# Patient Record
Sex: FEMALE | Race: WHITE | Employment: FULL TIME | URBAN - METROPOLITAN AREA
[De-identification: names, ages, dates, MRNs, and addresses within clinical notes are randomized per-mention and may not be internally consistent; named-entity substitution may affect disease eponyms.]

---

## 2017-04-18 ENCOUNTER — OFFICE VISIT (OUTPATIENT)
Dept: FAMILY MEDICINE CLINIC | Facility: CLINIC | Age: 37
End: 2017-04-18

## 2017-04-18 VITALS
OXYGEN SATURATION: 98 % | BODY MASS INDEX: 23.56 KG/M2 | HEART RATE: 80 BPM | WEIGHT: 120 LBS | DIASTOLIC BLOOD PRESSURE: 66 MMHG | TEMPERATURE: 98 F | SYSTOLIC BLOOD PRESSURE: 118 MMHG | HEIGHT: 60 IN | RESPIRATION RATE: 16 BRPM

## 2017-04-18 DIAGNOSIS — Z12.31 ENCOUNTER FOR SCREENING MAMMOGRAM FOR BREAST CANCER: ICD-10-CM

## 2017-04-18 DIAGNOSIS — F32.81 PMDD (PREMENSTRUAL DYSPHORIC DISORDER): ICD-10-CM

## 2017-04-18 DIAGNOSIS — G43.829 MENSTRUAL MIGRAINE WITHOUT STATUS MIGRAINOSUS, NOT INTRACTABLE: ICD-10-CM

## 2017-04-18 DIAGNOSIS — Z01.419 WELL WOMAN EXAM: Primary | ICD-10-CM

## 2017-04-18 PROCEDURE — 99395 PREV VISIT EST AGE 18-39: CPT | Performed by: INTERNAL MEDICINE

## 2017-04-18 RX ORDER — ONDANSETRON 8 MG/1
8 TABLET, ORALLY DISINTEGRATING ORAL EVERY 8 HOURS PRN
Qty: 10 TABLET | Refills: 0 | Status: SHIPPED | OUTPATIENT
Start: 2017-04-18 | End: 2019-10-23

## 2017-04-18 NOTE — PROGRESS NOTES
HPI:   Betsey Craig is a 40year old female who presents for a well woman exam. Symptoms: denies discharge, itching, burning or dysuria, periods are regular, flow is 7 days, continues to have PMS symptoms but only 2 days during menses, instead of t denies dysuria, vaginal discharge or itching; denies pelvic pain  MS: denies back pain  NEURO: chronic but stable headaches- no change in character; no dizziness  PSYCH: PMS as above; denies depression or anxiety  HEME: denies hx of anemia  ENDOCRINE: anita Tod Wang was given an opportunity to ask questions and verbalized understanding of care. Follow up 1 year. Jeffery Braga

## 2017-04-18 NOTE — PATIENT INSTRUCTIONS
LAB HOURS & LOCATIONS        [place]  ([facility])    [address]   [address] [place] [address]     ([address])         [address]    Mon-Fri  5am-8pm     Mon-Fri   7am-4pm  Sat         6am-3pm     Sat          7am-3pm      Rishi Leone

## 2017-04-26 ENCOUNTER — LAB ENCOUNTER (OUTPATIENT)
Dept: LAB | Age: 37
End: 2017-04-26
Attending: INTERNAL MEDICINE
Payer: COMMERCIAL

## 2017-04-26 ENCOUNTER — HOSPITAL ENCOUNTER (OUTPATIENT)
Dept: MAMMOGRAPHY | Facility: HOSPITAL | Age: 37
Discharge: HOME | End: 2017-04-26
Attending: INTERNAL MEDICINE
Payer: COMMERCIAL

## 2017-04-26 DIAGNOSIS — Z01.419 WELL WOMAN EXAM: ICD-10-CM

## 2017-04-26 DIAGNOSIS — Z12.31 ENCOUNTER FOR SCREENING MAMMOGRAM FOR BREAST CANCER: ICD-10-CM

## 2017-04-26 PROCEDURE — 82306 VITAMIN D 25 HYDROXY: CPT | Performed by: INTERNAL MEDICINE

## 2017-04-26 PROCEDURE — 80061 LIPID PANEL: CPT | Performed by: INTERNAL MEDICINE

## 2017-04-26 PROCEDURE — 77063 BREAST TOMOSYNTHESIS BI: CPT

## 2017-04-26 PROCEDURE — 77067 SCR MAMMO BI INCL CAD: CPT

## 2017-04-26 PROCEDURE — 36415 COLL VENOUS BLD VENIPUNCTURE: CPT | Performed by: INTERNAL MEDICINE

## 2017-04-26 PROCEDURE — 80050 GENERAL HEALTH PANEL: CPT | Performed by: INTERNAL MEDICINE

## 2019-10-23 ENCOUNTER — OFFICE VISIT (OUTPATIENT)
Dept: FAMILY MEDICINE CLINIC | Facility: CLINIC | Age: 39
End: 2019-10-23
Payer: COMMERCIAL

## 2019-10-23 VITALS
RESPIRATION RATE: 17 BRPM | WEIGHT: 117 LBS | OXYGEN SATURATION: 98 % | DIASTOLIC BLOOD PRESSURE: 80 MMHG | TEMPERATURE: 98 F | HEIGHT: 60 IN | BODY MASS INDEX: 22.97 KG/M2 | HEART RATE: 83 BPM | SYSTOLIC BLOOD PRESSURE: 110 MMHG

## 2019-10-23 DIAGNOSIS — H10.32 ACUTE BACTERIAL CONJUNCTIVITIS OF LEFT EYE: ICD-10-CM

## 2019-10-23 DIAGNOSIS — R05.9 COUGH: Primary | ICD-10-CM

## 2019-10-23 PROCEDURE — 99214 OFFICE O/P EST MOD 30 MIN: CPT | Performed by: FAMILY MEDICINE

## 2019-10-23 RX ORDER — BENZONATATE 100 MG/1
100 CAPSULE ORAL 3 TIMES DAILY PRN
Qty: 30 CAPSULE | Refills: 0 | Status: SHIPPED | OUTPATIENT
Start: 2019-10-23 | End: 2020-08-21

## 2019-10-23 RX ORDER — ALBUTEROL SULFATE 90 UG/1
2 AEROSOL, METERED RESPIRATORY (INHALATION) EVERY 4 HOURS PRN
Qty: 1 INHALER | Refills: 0 | Status: SHIPPED | OUTPATIENT
Start: 2019-10-23 | End: 2021-05-24

## 2019-10-23 RX ORDER — ALBUTEROL SULFATE 90 UG/1
2 AEROSOL, METERED RESPIRATORY (INHALATION)
COMMUNITY
End: 2019-10-23

## 2019-10-23 RX ORDER — AZITHROMYCIN 250 MG/1
TABLET, FILM COATED ORAL
Qty: 6 TABLET | Refills: 0 | Status: SHIPPED | OUTPATIENT
Start: 2019-10-23 | End: 2020-08-21

## 2019-10-23 RX ORDER — CIPROFLOXACIN HYDROCHLORIDE 3.5 MG/ML
1 SOLUTION/ DROPS TOPICAL
Qty: 1 BOTTLE | Refills: 0 | Status: SHIPPED | OUTPATIENT
Start: 2019-10-23 | End: 2019-10-30

## 2019-10-23 NOTE — PATIENT INSTRUCTIONS
What Is Conjunctivitis? Conjunctivitis is an irritation or infection. It affects the membrane that covers the white of your eye and the inside of your eyelid (conjunctiva). It can happen to one or both eyes.  The membrane swells and the blood vessels e Bronchitis is an infection of the air passages (bronchial tubes) in your lungs. It often occurs when you have a cold.  This illness is contagious during the first few days and is spread through the air by coughing and sneezing, or by direct contact (touchin Follow up with your healthcare provider, or as advised. If you had an X-ray or ECG (electrocardiogram), a specialist will review it. You will be told of any new test results that may affect your care.   If you are age 72 or older, if you smoke, or if you ha

## 2019-10-23 NOTE — PROGRESS NOTES
CHIEF COMPLAINT: Patient presents with:  Cough: dry  Voice Problem  Back Pain  Body ache and/or chills: aches no chills  Eye Problem: left, redness        HPI:     Nichol Conroy is a 46year old female presents for cough and left eye redness.     Starr Ophthalmic Solution, Place 1 drop into the left eye every 4 (four) hours while awake for 7 days. , Disp: 1 Bottle, Rfl: 0  OCELLA 3-0.03 MG Oral Tab, Take 1 tablet by mouth once daily. , Disp: , Rfl: 11  Fluticasone Propionate (FLONASE) 50 MCG/ACT Nasal Susp rate, regular rhythm and normal heart sounds. Pulmonary/Chest: Effort normal and breath sounds normal. No respiratory distress. She has no wheezes. Neurological: She is alert and oriented to person, place, and time. Skin: Skin is warm and dry.    Psy • Immature Granulocyte % 04/26/2017 0.2       REVIEWED THIS VISIT  ASSESSMENT/PLAN:   46year old female with    1. Cough  Day #10 of illness, start Zpack and Benzonatate TID prn, R/b/SEs of new meds d/w pt. Supportive measures also discussed.  If sx wors barriers to learning. Medical education done. Outcome: Patient verbalizes understanding and agrees with plan. Advised to call or RTC if symptoms persist or worsen.     Problem List:   Patient Active Problem List:     Migraine     Mood disorder ([facility] [address]     G

## 2020-08-21 ENCOUNTER — HOSPITAL ENCOUNTER (OUTPATIENT)
Age: 40
Discharge: HOME | End: 2020-08-21
Payer: COMMERCIAL

## 2020-08-21 VITALS
TEMPERATURE: 99 F | HEART RATE: 74 BPM | SYSTOLIC BLOOD PRESSURE: 122 MMHG | OXYGEN SATURATION: 97 % | RESPIRATION RATE: 20 BRPM | DIASTOLIC BLOOD PRESSURE: 74 MMHG

## 2020-08-21 DIAGNOSIS — B07.0 PLANTAR WART OF LEFT FOOT: Primary | ICD-10-CM

## 2020-08-21 DIAGNOSIS — L03.116 CELLULITIS OF LEFT LOWER EXTREMITY: ICD-10-CM

## 2020-08-21 PROCEDURE — 99203 OFFICE O/P NEW LOW 30 MIN: CPT | Performed by: PHYSICIAN ASSISTANT

## 2020-08-21 PROCEDURE — 90471 IMMUNIZATION ADMIN: CPT | Performed by: PHYSICIAN ASSISTANT

## 2020-08-21 PROCEDURE — 90715 TDAP VACCINE 7 YRS/> IM: CPT | Performed by: PHYSICIAN ASSISTANT

## 2020-08-21 RX ORDER — CEPHALEXIN 500 MG/1
500 CAPSULE ORAL 4 TIMES DAILY
Qty: 28 CAPSULE | Refills: 0 | Status: SHIPPED | OUTPATIENT
Start: 2020-08-21 | End: 2020-08-28

## 2020-08-21 NOTE — ED INITIAL ASSESSMENT (HPI)
Pt sts has a wart to left heel for 1 year. Has been walking a lot lately. Last several days left heel has been more painful, itching and burning. Yesterday pulled off a \"scab\" over the wart and today the area is red and swollen.

## 2020-08-25 ENCOUNTER — HOSPITAL ENCOUNTER (OUTPATIENT)
Age: 40
Discharge: HOME | End: 2020-08-25
Payer: COMMERCIAL

## 2020-08-25 VITALS
DIASTOLIC BLOOD PRESSURE: 86 MMHG | TEMPERATURE: 98 F | RESPIRATION RATE: 16 BRPM | SYSTOLIC BLOOD PRESSURE: 106 MMHG | HEART RATE: 80 BPM | OXYGEN SATURATION: 98 %

## 2020-08-25 DIAGNOSIS — L29.9 PRURITUS: Primary | ICD-10-CM

## 2020-08-25 DIAGNOSIS — Z51.89 ENCOUNTER FOR WOUND RE-CHECK: ICD-10-CM

## 2020-08-25 PROCEDURE — 99212 OFFICE O/P EST SF 10 MIN: CPT | Performed by: NURSE PRACTITIONER

## 2020-08-25 RX ORDER — METHYLPREDNISOLONE 4 MG/1
TABLET ORAL
Qty: 1 PACKAGE | Refills: 0 | Status: SHIPPED | OUTPATIENT
Start: 2020-08-25 | End: 2021-05-24

## 2020-08-25 NOTE — ED PROVIDER NOTES
[place] [id number] [facility] [place]   Patient presents with:  Laceration Abrasion  Rash Skin Problem    Stated Complaint: Follow up wound check    60-year-old female who presents to the immediate care with 2 complaints first com All other systems reviewed and negative except as noted above.     Physical Exam     ED Triage Vitals [08/25/20 1339]   /86   Pulse 80   Resp 16   Temp 97.9 °F (36.6 °C)   Temp src Temporal   SpO2 98 %   O2 Device None (Room air)       Current:/ I have discussed with the patient and/or family the results of test, differential diagnosis, treatment plan, warning signs and symptoms which should prompt immediate return.   The patient and/or family expressed clear understanding of these instructions and

## 2020-08-25 NOTE — ED INITIAL ASSESSMENT (HPI)
Wound check to left foot for infected plantar wart. Still taking antibiotic and it is improving a lot, still pain when walking. Unable to schedule podiatry appt yet because of insurance.  But main reason is she started having itchy skin and sensitive skin a

## 2021-05-24 ENCOUNTER — TELEMEDICINE (OUTPATIENT)
Dept: FAMILY MEDICINE CLINIC | Facility: CLINIC | Age: 41
End: 2021-05-24
Payer: COMMERCIAL

## 2021-05-24 DIAGNOSIS — R19.8 ABDOMINAL SYMPTOMS: Primary | ICD-10-CM

## 2021-05-24 DIAGNOSIS — Z13.220 SCREENING, LIPID: ICD-10-CM

## 2021-05-24 DIAGNOSIS — R19.7 NAUSEA, VOMITING AND DIARRHEA: ICD-10-CM

## 2021-05-24 DIAGNOSIS — R11.2 NAUSEA, VOMITING AND DIARRHEA: ICD-10-CM

## 2021-05-24 DIAGNOSIS — E56.9 VITAMIN DEFICIENCY: ICD-10-CM

## 2021-05-24 PROCEDURE — 99213 OFFICE O/P EST LOW 20 MIN: CPT | Performed by: PHYSICIAN ASSISTANT

## 2021-05-24 RX ORDER — DROSPIRENONE AND ETHINYL ESTRADIOL 0.03MG-3MG
1 KIT ORAL DAILY
COMMUNITY
Start: 2020-09-21 | End: 2021-05-24

## 2021-05-24 NOTE — PROGRESS NOTES
Video Visit    [place]  is a 37year old female. No chief complaint on file. HPI:   Patient presents today c/o abdominal symptoms off and on for quite some time.  She gets episodes about once a month during which she experiences sudden ons numbness/tingling, weakness, and headaches  ENDO: Denies polyuria, polydipsia, and tremors. EXAM:   GENERAL: well developed, well nourished, NAD. HEENT: AT/NC. Normal lips, gums, teeth, tongue.    LUNGS: Speaking in complete sentences comfortably withou ER in case there was an emergency. The patient was also advised of the potential privacy & security concerns related to the telehealth platform.    The patient was made aware of where to find [facility] notice of privacy practices, telehealth consent form and o

## 2021-05-25 ENCOUNTER — LAB ENCOUNTER (OUTPATIENT)
Dept: LAB | Age: 41
End: 2021-05-25
Attending: PHYSICIAN ASSISTANT
Payer: COMMERCIAL

## 2021-05-25 DIAGNOSIS — R11.2 NAUSEA, VOMITING AND DIARRHEA: ICD-10-CM

## 2021-05-25 DIAGNOSIS — Z13.220 SCREENING, LIPID: ICD-10-CM

## 2021-05-25 DIAGNOSIS — R19.7 NAUSEA, VOMITING AND DIARRHEA: ICD-10-CM

## 2021-05-25 DIAGNOSIS — E56.9 VITAMIN DEFICIENCY: ICD-10-CM

## 2021-05-25 DIAGNOSIS — R19.8 ABDOMINAL SYMPTOMS: ICD-10-CM

## 2021-05-25 PROCEDURE — 36415 COLL VENOUS BLD VENIPUNCTURE: CPT

## 2021-05-25 PROCEDURE — 80061 LIPID PANEL: CPT

## 2021-05-25 PROCEDURE — 82607 VITAMIN B-12: CPT

## 2021-05-25 PROCEDURE — 80053 COMPREHEN METABOLIC PANEL: CPT

## 2021-05-25 PROCEDURE — 84439 ASSAY OF FREE THYROXINE: CPT

## 2021-05-25 PROCEDURE — 85025 COMPLETE CBC W/AUTO DIFF WBC: CPT

## 2021-05-25 PROCEDURE — 83036 HEMOGLOBIN GLYCOSYLATED A1C: CPT

## 2021-05-25 PROCEDURE — 82306 VITAMIN D 25 HYDROXY: CPT

## 2021-05-25 PROCEDURE — 84443 ASSAY THYROID STIM HORMONE: CPT

## 2021-11-08 ENCOUNTER — HOSPITAL ENCOUNTER (OUTPATIENT)
Dept: MAMMOGRAPHY | Facility: HOSPITAL | Age: 41
Discharge: HOME | End: 2021-11-08
Attending: OBSTETRICS & GYNECOLOGY
Payer: COMMERCIAL

## 2021-11-08 DIAGNOSIS — Z12.31 ENCOUNTER FOR SCREENING MAMMOGRAM FOR MALIGNANT NEOPLASM OF BREAST: ICD-10-CM

## 2021-11-08 PROCEDURE — 77063 BREAST TOMOSYNTHESIS BI: CPT | Performed by: OBSTETRICS & GYNECOLOGY

## 2021-11-08 PROCEDURE — 77067 SCR MAMMO BI INCL CAD: CPT | Performed by: OBSTETRICS & GYNECOLOGY

## 2021-12-09 ENCOUNTER — TELEPHONE (OUTPATIENT)
Dept: FAMILY MEDICINE CLINIC | Facility: CLINIC | Age: 41
End: 2021-12-09

## 2021-12-09 NOTE — TELEPHONE ENCOUNTER
Patient has some fluttering and her bp is running 154/90. Sending to triage to decide if IC ER or office is where she should be.

## 2021-12-14 ENCOUNTER — PATIENT MESSAGE (OUTPATIENT)
Dept: FAMILY MEDICINE CLINIC | Facility: CLINIC | Age: 41
End: 2021-12-14

## 2021-12-14 ENCOUNTER — OFFICE VISIT (OUTPATIENT)
Dept: FAMILY MEDICINE CLINIC | Facility: CLINIC | Age: 41
End: 2021-12-14
Payer: COMMERCIAL

## 2021-12-14 VITALS
OXYGEN SATURATION: 98 % | BODY MASS INDEX: 24.35 KG/M2 | SYSTOLIC BLOOD PRESSURE: 132 MMHG | DIASTOLIC BLOOD PRESSURE: 82 MMHG | RESPIRATION RATE: 16 BRPM | HEART RATE: 88 BPM | HEIGHT: 60 IN | WEIGHT: 124 LBS

## 2021-12-14 DIAGNOSIS — R03.0 ELEVATED BLOOD PRESSURE READING WITHOUT DIAGNOSIS OF HYPERTENSION: Primary | ICD-10-CM

## 2021-12-14 DIAGNOSIS — R03.0 ELEVATED BLOOD PRESSURE READING WITHOUT DIAGNOSIS OF HYPERTENSION: ICD-10-CM

## 2021-12-14 DIAGNOSIS — G43.829 MENSTRUAL MIGRAINE WITHOUT STATUS MIGRAINOSUS, NOT INTRACTABLE: ICD-10-CM

## 2021-12-14 DIAGNOSIS — R00.2 PALPITATIONS: Primary | ICD-10-CM

## 2021-12-14 PROCEDURE — 3075F SYST BP GE 130 - 139MM HG: CPT | Performed by: FAMILY MEDICINE

## 2021-12-14 PROCEDURE — 3008F BODY MASS INDEX DOCD: CPT | Performed by: FAMILY MEDICINE

## 2021-12-14 PROCEDURE — 3079F DIAST BP 80-89 MM HG: CPT | Performed by: FAMILY MEDICINE

## 2021-12-14 PROCEDURE — 99213 OFFICE O/P EST LOW 20 MIN: CPT | Performed by: FAMILY MEDICINE

## 2021-12-14 RX ORDER — UBROGEPANT 100 MG/1
TABLET ORAL
COMMUNITY
Start: 2021-11-30

## 2021-12-14 RX ORDER — FEXOFENADINE HCL 180 MG/1
180 TABLET ORAL DAILY
COMMUNITY

## 2021-12-14 NOTE — TELEPHONE ENCOUNTER
The booster should impact blood pressure, usual side effects are more body aches, and fatigue. Though it could confuse the issue of where symptoms are coming from, I think it is best to proceed with it, as covid number have been going up recently.     Hue Talley

## 2021-12-14 NOTE — PROGRESS NOTES
[facility] Progress Note    SUBJECTIVE: Liliana Osuna 37year old female is here today for Patient presents with:  Blood Pressure      Has been seeing a headache specialist for migraine, has menstrual migraine, and has for along time, and definite shortness of breath. PMH  No past medical history on file.      PSH  Past Surgical History:   Procedure Laterality Date   • REBECA BIOPSY STEREO NODULE 1 SITE RIGHT (CPT=19081)  3/2014    calc- stromal fibrosis        Social Hx:  No changes

## 2021-12-20 NOTE — TELEPHONE ENCOUNTER
Yes we can, classically treatment is 25 mg BID, I wrote it that way, but ok to cut inhalf and take half a tab twice a day for a few days to a week first and see if any side effects, and if so stop go a different direction    Mani De Jesus MD

## 2022-01-14 DIAGNOSIS — R03.0 ELEVATED BLOOD PRESSURE READING WITHOUT DIAGNOSIS OF HYPERTENSION: ICD-10-CM

## 2022-01-14 DIAGNOSIS — G43.829 MENSTRUAL MIGRAINE WITHOUT STATUS MIGRAINOSUS, NOT INTRACTABLE: ICD-10-CM

## 2022-07-11 ENCOUNTER — OFFICE VISIT (OUTPATIENT)
Dept: FAMILY MEDICINE CLINIC | Facility: CLINIC | Age: 42
End: 2022-07-11
Payer: COMMERCIAL

## 2022-07-11 VITALS
WEIGHT: 124 LBS | HEIGHT: 60 IN | DIASTOLIC BLOOD PRESSURE: 78 MMHG | BODY MASS INDEX: 24.35 KG/M2 | SYSTOLIC BLOOD PRESSURE: 122 MMHG

## 2022-07-11 DIAGNOSIS — R03.0 ELEVATED BLOOD PRESSURE READING WITHOUT DIAGNOSIS OF HYPERTENSION: Primary | ICD-10-CM

## 2022-07-11 DIAGNOSIS — L03.116 CELLULITIS OF LEFT LOWER EXTREMITY: ICD-10-CM

## 2022-07-11 DIAGNOSIS — R53.83 FATIGUE, UNSPECIFIED TYPE: ICD-10-CM

## 2022-07-11 PROCEDURE — 3008F BODY MASS INDEX DOCD: CPT | Performed by: FAMILY MEDICINE

## 2022-07-11 PROCEDURE — 3074F SYST BP LT 130 MM HG: CPT | Performed by: FAMILY MEDICINE

## 2022-07-11 PROCEDURE — 3078F DIAST BP <80 MM HG: CPT | Performed by: FAMILY MEDICINE

## 2022-07-11 PROCEDURE — 99213 OFFICE O/P EST LOW 20 MIN: CPT | Performed by: FAMILY MEDICINE

## 2022-07-11 RX ORDER — ATOGEPANT 30 MG/1
1 TABLET ORAL NIGHTLY
COMMUNITY
Start: 2022-06-08

## 2022-07-11 RX ORDER — CLINDAMYCIN HYDROCHLORIDE 300 MG/1
300 CAPSULE ORAL 3 TIMES DAILY
Qty: 21 CAPSULE | Refills: 0 | Status: SHIPPED | OUTPATIENT
Start: 2022-07-11

## 2022-08-09 DIAGNOSIS — R03.0 ELEVATED BLOOD PRESSURE READING WITHOUT DIAGNOSIS OF HYPERTENSION: ICD-10-CM

## 2022-08-09 DIAGNOSIS — G43.829 MENSTRUAL MIGRAINE WITHOUT STATUS MIGRAINOSUS, NOT INTRACTABLE: ICD-10-CM

## 2022-09-02 DIAGNOSIS — R05.9 COUGH: ICD-10-CM

## 2022-09-02 RX ORDER — ALBUTEROL SULFATE 90 UG/1
2 AEROSOL, METERED RESPIRATORY (INHALATION) EVERY 4 HOURS PRN
Qty: 1 EACH | Refills: 0 | Status: SHIPPED | OUTPATIENT
Start: 2022-09-02

## 2022-09-02 NOTE — TELEPHONE ENCOUNTER
Pt requesting refill of albuterol / she has allergy induced asthma and was prescribed this in the past

## 2023-05-04 ENCOUNTER — OFFICE VISIT (OUTPATIENT)
Dept: FAMILY MEDICINE CLINIC | Facility: CLINIC | Age: 43
End: 2023-05-04
Payer: COMMERCIAL

## 2023-05-04 VITALS
HEIGHT: 60 IN | WEIGHT: 130 LBS | BODY MASS INDEX: 25.52 KG/M2 | DIASTOLIC BLOOD PRESSURE: 78 MMHG | SYSTOLIC BLOOD PRESSURE: 128 MMHG | RESPIRATION RATE: 16 BRPM | OXYGEN SATURATION: 98 % | HEART RATE: 115 BPM

## 2023-05-04 DIAGNOSIS — R39.9 URINARY SYMPTOM OR SIGN: Primary | ICD-10-CM

## 2023-05-04 DIAGNOSIS — F41.9 ANXIETY: ICD-10-CM

## 2023-05-04 DIAGNOSIS — K59.09 CHRONIC CONSTIPATION: ICD-10-CM

## 2023-05-04 DIAGNOSIS — L30.9 ECZEMA, UNSPECIFIED TYPE: ICD-10-CM

## 2023-05-04 DIAGNOSIS — Z00.00 GENERAL MEDICAL EXAM: ICD-10-CM

## 2023-05-04 LAB
APPEARANCE: CLEAR
BILIRUBIN: NEGATIVE
GLUCOSE (URINE DIPSTICK): NEGATIVE MG/DL
KETONES (URINE DIPSTICK): NEGATIVE MG/DL
LEUKOCYTES: NEGATIVE
MULTISTIX LOT#: NORMAL NUMERIC
NITRITE, URINE: NEGATIVE
OCCULT BLOOD: NEGATIVE
PH, URINE: 6 (ref 4.5–8)
PROTEIN (URINE DIPSTICK): NEGATIVE MG/DL
SPECIFIC GRAVITY: 1.01 (ref 1–1.03)
URINE-COLOR: YELLOW
UROBILINOGEN,SEMI-QN: 0.2 MG/DL (ref 0–1.9)

## 2023-05-04 PROCEDURE — 87086 URINE CULTURE/COLONY COUNT: CPT | Performed by: FAMILY MEDICINE

## 2023-05-04 PROCEDURE — 3078F DIAST BP <80 MM HG: CPT | Performed by: FAMILY MEDICINE

## 2023-05-04 PROCEDURE — 3074F SYST BP LT 130 MM HG: CPT | Performed by: FAMILY MEDICINE

## 2023-05-04 PROCEDURE — 81003 URINALYSIS AUTO W/O SCOPE: CPT | Performed by: FAMILY MEDICINE

## 2023-05-04 PROCEDURE — 3008F BODY MASS INDEX DOCD: CPT | Performed by: FAMILY MEDICINE

## 2023-05-04 PROCEDURE — 99215 OFFICE O/P EST HI 40 MIN: CPT | Performed by: FAMILY MEDICINE

## 2023-05-04 RX ORDER — ALPRAZOLAM 0.25 MG/1
0.25 TABLET ORAL NIGHTLY PRN
Qty: 20 TABLET | Refills: 0 | Status: SHIPPED | OUTPATIENT
Start: 2023-05-04

## 2023-05-04 RX ORDER — FLUOXETINE 10 MG/1
10 TABLET, FILM COATED ORAL DAILY
Qty: 60 TABLET | Refills: 0 | Status: SHIPPED | OUTPATIENT
Start: 2023-05-04

## 2023-05-08 ENCOUNTER — HOSPITAL ENCOUNTER (OUTPATIENT)
Dept: GENERAL RADIOLOGY | Age: 43
Discharge: HOME | End: 2023-05-08
Attending: FAMILY MEDICINE
Payer: COMMERCIAL

## 2023-05-08 DIAGNOSIS — K59.09 CHRONIC CONSTIPATION: ICD-10-CM

## 2023-05-08 PROCEDURE — 74018 RADEX ABDOMEN 1 VIEW: CPT | Performed by: FAMILY MEDICINE

## 2023-05-09 ENCOUNTER — LAB ENCOUNTER (OUTPATIENT)
Dept: LAB | Age: 43
End: 2023-05-09
Attending: FAMILY MEDICINE
Payer: COMMERCIAL

## 2023-05-09 DIAGNOSIS — Z00.00 GENERAL MEDICAL EXAM: ICD-10-CM

## 2023-05-09 LAB
ALBUMIN SERPL-MCNC: 4.1 G/DL (ref 3.4–5)
ALBUMIN/GLOB SERPL: 1 {RATIO} (ref 1–2)
ALP LIVER SERPL-CCNC: 39 U/L
ALT SERPL-CCNC: 22 U/L
ANION GAP SERPL CALC-SCNC: 0 MMOL/L (ref 0–18)
AST SERPL-CCNC: 16 U/L (ref 15–37)
BASOPHILS # BLD AUTO: 0.04 X10(3) UL (ref 0–0.2)
BASOPHILS NFR BLD AUTO: 0.7 %
BILIRUB SERPL-MCNC: 0.5 MG/DL (ref 0.1–2)
BUN BLD-MCNC: 14 MG/DL (ref 7–18)
CALCIUM BLD-MCNC: 9.7 MG/DL (ref 8.5–10.1)
CHLORIDE SERPL-SCNC: 109 MMOL/L (ref 98–112)
CHOLEST SERPL-MCNC: 205 MG/DL (ref ?–200)
CO2 SERPL-SCNC: 27 MMOL/L (ref 21–32)
CREAT BLD-MCNC: 0.8 MG/DL
EOSINOPHIL # BLD AUTO: 0.25 X10(3) UL (ref 0–0.7)
EOSINOPHIL NFR BLD AUTO: 4.5 %
ERYTHROCYTE [DISTWIDTH] IN BLOOD BY AUTOMATED COUNT: 12.1 %
EST. AVERAGE GLUCOSE BLD GHB EST-MCNC: 111 MG/DL (ref 68–126)
FASTING PATIENT LIPID ANSWER: YES
FASTING STATUS PATIENT QL REPORTED: YES
GFR SERPLBLD BASED ON 1.73 SQ M-ARVRAT: 94 ML/MIN/1.73M2 (ref 60–?)
GLOBULIN PLAS-MCNC: 4 G/DL (ref 2.8–4.4)
GLUCOSE BLD-MCNC: 89 MG/DL (ref 70–99)
HBA1C MFR BLD: 5.5 % (ref ?–5.7)
HCT VFR BLD AUTO: 41.8 %
HDLC SERPL-MCNC: 65 MG/DL (ref 40–59)
HGB BLD-MCNC: 13.6 G/DL
IMM GRANULOCYTES # BLD AUTO: 0.01 X10(3) UL (ref 0–1)
IMM GRANULOCYTES NFR BLD: 0.2 %
LDLC SERPL CALC-MCNC: 129 MG/DL (ref ?–100)
LYMPHOCYTES # BLD AUTO: 1.92 X10(3) UL (ref 1–4)
LYMPHOCYTES NFR BLD AUTO: 34.3 %
MCH RBC QN AUTO: 30.7 PG (ref 26–34)
MCHC RBC AUTO-ENTMCNC: 32.5 G/DL (ref 31–37)
MCV RBC AUTO: 94.4 FL
MONOCYTES # BLD AUTO: 0.48 X10(3) UL (ref 0.1–1)
MONOCYTES NFR BLD AUTO: 8.6 %
NEUTROPHILS # BLD AUTO: 2.9 X10 (3) UL (ref 1.5–7.7)
NEUTROPHILS # BLD AUTO: 2.9 X10(3) UL (ref 1.5–7.7)
NEUTROPHILS NFR BLD AUTO: 51.7 %
NONHDLC SERPL-MCNC: 140 MG/DL (ref ?–130)
OSMOLALITY SERPL CALC.SUM OF ELEC: 282 MOSM/KG (ref 275–295)
PLATELET # BLD AUTO: 275 10(3)UL (ref 150–450)
POTASSIUM SERPL-SCNC: 4 MMOL/L (ref 3.5–5.1)
PROT SERPL-MCNC: 8.1 G/DL (ref 6.4–8.2)
RBC # BLD AUTO: 4.43 X10(6)UL
SODIUM SERPL-SCNC: 136 MMOL/L (ref 136–145)
T4 FREE SERPL-MCNC: 1 NG/DL (ref 0.8–1.7)
TRIGL SERPL-MCNC: 60 MG/DL (ref 30–149)
TSI SER-ACNC: 1.46 MIU/ML (ref 0.36–3.74)
VIT B12 SERPL-MCNC: 980 PG/ML (ref 193–986)
VIT D+METAB SERPL-MCNC: 25.3 NG/ML (ref 30–100)
VLDLC SERPL CALC-MCNC: 11 MG/DL (ref 0–30)
WBC # BLD AUTO: 5.6 X10(3) UL (ref 4–11)

## 2023-05-09 PROCEDURE — 82607 VITAMIN B-12: CPT

## 2023-05-09 PROCEDURE — 84443 ASSAY THYROID STIM HORMONE: CPT

## 2023-05-09 PROCEDURE — 80061 LIPID PANEL: CPT

## 2023-05-09 PROCEDURE — 80053 COMPREHEN METABOLIC PANEL: CPT

## 2023-05-09 PROCEDURE — 85025 COMPLETE CBC W/AUTO DIFF WBC: CPT

## 2023-05-09 PROCEDURE — 36415 COLL VENOUS BLD VENIPUNCTURE: CPT

## 2023-05-09 PROCEDURE — 84439 ASSAY OF FREE THYROXINE: CPT

## 2023-05-09 PROCEDURE — 83036 HEMOGLOBIN GLYCOSYLATED A1C: CPT

## 2023-05-09 PROCEDURE — 82306 VITAMIN D 25 HYDROXY: CPT

## 2023-06-16 ENCOUNTER — HOSPITAL ENCOUNTER (OUTPATIENT)
Dept: MAMMOGRAPHY | Age: 43
Discharge: HOME | End: 2023-06-16
Attending: FAMILY MEDICINE
Payer: COMMERCIAL

## 2023-06-16 DIAGNOSIS — Z12.31 ENCOUNTER FOR SCREENING MAMMOGRAM FOR HIGH-RISK PATIENT: ICD-10-CM

## 2023-06-16 DIAGNOSIS — R92.2 DENSE BREAST: Primary | ICD-10-CM

## 2023-06-16 PROCEDURE — 77067 SCR MAMMO BI INCL CAD: CPT | Performed by: FAMILY MEDICINE

## 2023-06-16 PROCEDURE — 77063 BREAST TOMOSYNTHESIS BI: CPT | Performed by: FAMILY MEDICINE

## 2023-08-09 ENCOUNTER — HOSPITAL ENCOUNTER (OUTPATIENT)
Dept: MAMMOGRAPHY | Facility: HOSPITAL | Age: 43
Discharge: HOME | End: 2023-08-09
Attending: FAMILY MEDICINE
Payer: COMMERCIAL

## 2023-08-09 DIAGNOSIS — R92.2 DENSE BREAST: ICD-10-CM

## 2023-08-09 PROCEDURE — 76641 ULTRASOUND BREAST COMPLETE: CPT | Performed by: FAMILY MEDICINE

## 2023-08-24 NOTE — ED PROVIDER NOTES
Patient Seen in: [address] [facility] [place]      History   Patient presents with:  Rash Skin Problem    Stated Complaint: Infected plantar's wart on heel    HPI    69-year-old female who comes in today complaining of a plantars wart that is been on Cardiovascular:      Rate and Rhythm: Normal rate and regular rhythm. Heart sounds: Normal heart sounds. Pulmonary:      Effort: Pulmonary effort is normal. No respiratory distress. Breath sounds: Normal breath sounds. No wheezing or rales.    [place] Schedule an appointment as soon as possible for a visit       [facility] in [address] [facility] [id number] [place] [address] [phone]  In 2 days  If symptoms worsen, For wound re-check          Medications Prescribed:  Discharge Medication Oral Minoxidil Counseling- I discussed with the patient the risks of oral minoxidil including but not limited to shortness of breath, swelling of the feet or ankles, dizziness, lightheadedness, unwanted hair growth and allergic reaction.  The patient verbalized understanding of the proper use and possible adverse effects of oral minoxidil.  All of the patient's questions and concerns were addressed.

## 2023-12-29 DIAGNOSIS — F32.A ANXIETY AND DEPRESSION: ICD-10-CM

## 2023-12-29 DIAGNOSIS — F41.9 ANXIETY AND DEPRESSION: ICD-10-CM

## 2023-12-29 RX ORDER — BUPROPION HYDROCHLORIDE 150 MG/1
150 TABLET ORAL DAILY
Qty: 90 TABLET | Refills: 0 | Status: SHIPPED | OUTPATIENT
Start: 2023-12-29

## 2023-12-29 NOTE — TELEPHONE ENCOUNTER
.A refill request was received for:  Requested Prescriptions     Pending Prescriptions Disp Refills    buPROPion  MG Oral Tablet 24 Hr 90 tablet 0     Sig: Take 1 tablet (150 mg total) by mouth daily.        Last refill date:   12/5/2023    Last office visit: 12/5/2023    Follow up due:  Future Appointments   Date Time Provider Nancy Santillan   2/16/2024 10:00 AM Citlali Altman DO [facility] 13  [facility] asking for 90 days

## 2024-03-03 DIAGNOSIS — F32.A ANXIETY AND DEPRESSION: ICD-10-CM

## 2024-03-03 DIAGNOSIS — F41.9 ANXIETY AND DEPRESSION: ICD-10-CM

## 2024-03-04 RX ORDER — PROPRANOLOL HYDROCHLORIDE 20 MG/1
20 TABLET ORAL 2 TIMES DAILY
Qty: 180 TABLET | Refills: 0 | Status: SHIPPED | OUTPATIENT
Start: 2024-03-04

## 2024-04-21 NOTE — TELEPHONE ENCOUNTER
From: Moni Ordonez  To: Melecio Bee MD  Sent: 12/14/2021 12:32 PM CST  Subject: Blood Pressure and covid vaccine    Hi Dr. nAil Elizabeth,    I forgot to ask you this morning…I am due to get my covid booster this week.  Do you think given these blood pre no

## 2024-05-30 DIAGNOSIS — F41.9 ANXIETY AND DEPRESSION: ICD-10-CM

## 2024-05-30 DIAGNOSIS — F32.A ANXIETY AND DEPRESSION: ICD-10-CM

## 2024-05-31 RX ORDER — FLUOXETINE 20 MG/1
20 TABLET, FILM COATED ORAL DAILY
Qty: 90 TABLET | Refills: 0 | Status: SHIPPED | OUTPATIENT
Start: 2024-05-31

## 2024-05-31 RX ORDER — PROPRANOLOL HYDROCHLORIDE 20 MG/1
20 TABLET ORAL 2 TIMES DAILY
Qty: 180 TABLET | Refills: 0 | Status: SHIPPED | OUTPATIENT
Start: 2024-05-31

## 2024-09-08 DIAGNOSIS — F41.9 ANXIETY AND DEPRESSION: ICD-10-CM

## 2024-09-08 DIAGNOSIS — F32.A ANXIETY AND DEPRESSION: ICD-10-CM

## 2024-09-09 NOTE — TELEPHONE ENCOUNTER
.A refill request was received for:  Requested Prescriptions     Pending Prescriptions Disp Refills    PROPRANOLOL 20 MG Oral Tab [Pharmacy Med Name: PROPRANOLOL 20 MG TABLET] 180 tablet 0     Sig: TAKE 1 TABLET BY MOUTH TWICE A DAY       Last refill date:   5/31/2024    Last office visit: 8/19/2024    Follow up due:  No future appointments.

## 2024-09-10 RX ORDER — PROPRANOLOL HYDROCHLORIDE 20 MG/1
20 TABLET ORAL 2 TIMES DAILY
Qty: 180 TABLET | Refills: 0 | Status: SHIPPED | OUTPATIENT
Start: 2024-09-10

## 2024-09-16 ENCOUNTER — HOSPITAL ENCOUNTER (OUTPATIENT)
Dept: MAMMOGRAPHY | Age: 44
Discharge: HOME | End: 2024-09-16
Attending: FAMILY MEDICINE
Payer: COMMERCIAL

## 2024-09-16 DIAGNOSIS — Z12.31 ENCOUNTER FOR SCREENING MAMMOGRAM FOR HIGH-RISK PATIENT: ICD-10-CM

## 2024-09-16 PROCEDURE — 77067 SCR MAMMO BI INCL CAD: CPT | Performed by: FAMILY MEDICINE

## 2024-09-16 PROCEDURE — 77063 BREAST TOMOSYNTHESIS BI: CPT | Performed by: FAMILY MEDICINE

## 2024-11-10 DIAGNOSIS — F41.9 ANXIETY AND DEPRESSION: ICD-10-CM

## 2024-11-10 DIAGNOSIS — F32.A ANXIETY AND DEPRESSION: ICD-10-CM

## 2024-11-11 RX ORDER — FLUOXETINE 10 MG/1
10 CAPSULE ORAL DAILY
Qty: 90 CAPSULE | Refills: 0 | Status: SHIPPED | OUTPATIENT
Start: 2024-11-11

## 2024-11-11 RX ORDER — FLUOXETINE 20 MG/1
20 TABLET, FILM COATED ORAL DAILY
Qty: 90 TABLET | Refills: 0 | Status: SHIPPED | OUTPATIENT
Start: 2024-11-11

## 2025-01-27 ENCOUNTER — TELEMEDICINE (OUTPATIENT)
Dept: FAMILY MEDICINE CLINIC | Facility: CLINIC | Age: 45
End: 2025-01-27
Payer: COMMERCIAL

## 2025-01-27 DIAGNOSIS — F41.9 ANXIETY AND DEPRESSION: ICD-10-CM

## 2025-01-27 DIAGNOSIS — R06.83 SNORING: Primary | ICD-10-CM

## 2025-01-27 DIAGNOSIS — F32.A ANXIETY AND DEPRESSION: ICD-10-CM

## 2025-01-27 RX ORDER — FLUOXETINE 20 MG/1
20 TABLET, FILM COATED ORAL DAILY
Qty: 90 TABLET | Refills: 0 | Status: SHIPPED | OUTPATIENT
Start: 2025-01-27

## 2025-01-27 RX ORDER — FLUOXETINE 10 MG/1
10 CAPSULE ORAL DAILY
Qty: 90 CAPSULE | Refills: 0 | Status: SHIPPED | OUTPATIENT
Start: 2025-01-27

## 2025-01-27 NOTE — PROGRESS NOTES
This visit is conducted using Telemedicine with live, interactive video and audio.    [facility]health outside of [facility]  Telehealth Verbal Consent   I conducted a telehealth visit with Angelina Osuna today, 01/27/25, which was completed using two-way, real-time interactive audio and video communication. This has been done in good lex to provide continuity of care in the best interest of the provider-patient relationship, due to the COVID -19 public health crisis/national emergency where restrictions of face-to-face office visits are ongoing. Every conscious effort was taken to allow for sufficient and adequate time to complete the visit.  The patient was made aware of the limitations of the telehealth visit, including treatment limitations as no physical exam could be performed.  The patient was advised to call 911 or to go to the ER in case there was an emergency.  The patient was also advised of the potential privacy & security concerns related to the telehealth platform.   The patient was made aware of where to find [facility]'s notice of privacy practices, telehealth consent form and other related consent forms and documents.  which are located on the [facility] website. The patient verbally agreed to telehealth consent form, related consents and the risks discussed.    Lastly, the patient confirmed that they were in [place].   Included in this visit, time may have been spent reviewing labs, medications, radiology tests and decision making. Appropriate medical decision-making and tests are ordered as detailed in the plan of care above.  Coding/billing information is submitted for this visit based on complexity of care and/or time spent for the visit.      HPI:   Angelina Osuna is a 44 year old female here to f/u on mood    Pt feeling better on the higher fluoxetine 30mg and off OCP's   Pt has minimal anxiety   Less irritable   Less migraines and has backed off her medications   Not depressed but it did  take some time to acclimate to higher fluoxetine  Not sleeping great   minimal hot flashes     No SE    Normal appeite  no anhedonia  no hopelessness   normal concentration     [facility] - Dr. Huizar  Using propranolol prn       Current Outpatient Medications   Medication Sig Dispense Refill    FLUoxetine HCl 20 MG Oral Tab Take 1 tablet (20 mg total) by mouth daily. 90 tablet 0    FLUoxetine 10 MG Oral Cap Take 1 capsule (10 mg total) by mouth daily. 90 capsule 0    propranolol 20 MG Oral Tab Take 1 tablet (20 mg total) by mouth 2 (two) times daily. 180 tablet 0    fluocinonide 0.05 % External Cream Apply bid For 10-14 days 30 g 0    Naratriptan HCl 1 MG Oral Tab       Drospirenone (SLYND) 4 MG Oral Tab Take 4 mg by mouth daily.      albuterol 108 (90 Base) MCG/ACT Inhalation Aero Soln Inhale 2 puffs into the lungs every 4 (four) hours as needed for Wheezing. 1 each 0    QULIPTA 30 MG Oral Tab Take 1 tablet by mouth nightly.      UBRELVY 100 MG Oral Tab TAKE 1 TABLET BY MOUTH AT ONSET OF MIGRAINE. MAY REPEAT 1 TIME IN 2 HOURS. MAX 2 PER DAY        No past medical history on file.   Past Surgical History:   Procedure Laterality Date    Homero biopsy stereo nodule 1 site right (cpt=19081)  3/2014    calc- stromal fibrosis      Family History   Problem Relation Age of Onset    Breast Cancer Maternal Cousin Female 40      Social History:   Social History     Socioeconomic History    Marital status:    Tobacco Use    Smoking status: Never    Smokeless tobacco: Never   Substance and Sexual Activity    Alcohol use: No    Drug use: No   Other Topics Concern    Caffeine Concern Yes     Comment: 1-2 cups coffee a day    Exercise Yes     Social Drivers of Health      [facility]    Social Connections    [facility]    Housing Stability     Occ: . : . Children: 2  Works  at home .   Exercise: three times per week.  Diet: watches minimally     REVIEW OF SYSTEMS:   GENERAL: feels well otherwise  SKIN: denies any unusual skin lesions  EYES:denies blurred vision or double vision  HEENT: denies nasal congestion, sinus pain or ST  LUNGS: denies shortness of breath with exertion  CARDIOVASCULAR: denies chest pain on exertion  GI: denies abdominal pain,denies heartburn  : denies dysuria, vaginal discharge or itching   MUSCULOSKELETAL: denies back pain  NEURO: denies headaches  PSYCHE: denies depression or anxiety  HEMATOLOGIC: denies hx of anemia  ENDOCRINE: denies thyroid history  ALL/ASTHMA: +  asthma    EXAM:   alert, appears stated age and cooperative, Normocephalic, without obvious abnormality, atraumatic, lips, mucosa, and tongue normal; teeth and gums normal, Speaking in full sentences comfortably, Normal work of breathing, Skin color, texture, turgor normal. No rashes or lesions and age appropriate, normal, logical connections, person, place and time/date and no suicidal ideation      ASSESSMENT AND PLAN:   Angelina MerlineZuleima Osuna is a 44 year old female who presents with     1. Anxiety and depression    - FLUoxetine HCl 20 MG Oral Tab; Take 1 tablet (20 mg total) by mouth daily.  Dispense: 90 tablet; Refill: 0  - FLUoxetine 10 MG Oral Cap; Take 1 capsule (10 mg total) by mouth daily.  Dispense: 90 capsule; Refill: 0    2. Snoring    - Diagnostic Sleep Study-split night PAP implemented if criteria met  - General sleep study; Future        Questions answered and patient indicates understanding of these issues and agrees to the plan.  Follow up in 3 mo or sooner if needed.

## 2025-05-05 NOTE — PROGRESS NOTES
HPI:   Angelina Osuna is a 45 year old female who presents for a complete physical exam.     Wt Readings from Last 6 Encounters:   25 125 lb (56.7 kg)   24 129 lb (58.5 kg)   23 126 lb (57.2 kg)   23 127 lb (57.6 kg)   23 125 lb (56.7 kg)   23 130 lb (59 kg)     Body mass index is 24.41 kg/m².     Cholesterol, Total (mg/dL)   Date Value   2023 205 (H)   2021 188   2017 185     HDL Cholesterol (mg/dL)   Date Value   2023 65 (H)   2021 91 (H)   2017 80     LDL Cholesterol (mg/dL)   Date Value   2023 129 (H)   2021 79   2017 86     AST (U/L)   Date Value   2023 16   2021 16   2017 35     ALT (U/L)   Date Value   2023 22   2021 22   2017 41       [facility]   all previous paps normal  No vaginal discharge  No bladder dysfunction  irreg menses- 2-3 weeks per month  birth control- off ocp's / vasectomy  + performing self breast exams  last mammogram-  UTD  Dexa - never   - never   No calcium and vit D supplementation  No family history of breast colon or ovarian cancer  [facility]M uterine cancer       Current Outpatient Medications   Medication Sig Dispense Refill    FLUoxetine HCl 20 MG Oral Tab Take 1 tablet (20 mg total) by mouth daily. 90 tablet 0    FLUoxetine 10 MG Oral Cap Take 1 capsule (10 mg total) by mouth daily. 90 capsule 0    Naratriptan HCl 1 MG Oral Tab       albuterol 108 (90 Base) MCG/ACT Inhalation Aero Soln Inhale 2 puffs into the lungs every 4 (four) hours as needed for Wheezing. 1 each 0    UBRELVY 100 MG Oral Tab TAKE 1 TABLET BY MOUTH AT ONSET OF MIGRAINE. MAY REPEAT 1 TIME IN 2 HOURS. MAX 2 PER DAY      propranolol 20 MG Oral Tab Take 1 tablet (20 mg total) by mouth 2 (two) times daily. (Patient not taking: Reported on 2025) 180 tablet 0    fluocinonide 0.05 % External Cream Apply bid For 10-14 days 30 g 0      No past  medical history on file.   Past Surgical History:   Procedure Laterality Date    Homero biopsy stereo nodule 1 site right (cpt=19081)  3/2014    calc- stromal fibrosis      Family History   Problem Relation Age of Onset    Breast Cancer Maternal Cousin Female 40      Social History:   Social History     Socioeconomic History    Marital status:    Tobacco Use    Smoking status: Never    Smokeless tobacco: Never   Substance and Sexual Activity    Alcohol use: No    Drug use: No   Other Topics Concern    Caffeine Concern Yes     Comment: 1-2 cups coffee a day    Exercise Yes     Social Drivers of Health      Received from [facility]    Housing Stability     Occ: . : . Children: 2  Works at home .   Exercise: three times per week.  Diet: watches minimally     REVIEW OF SYSTEMS:   GENERAL: feels well otherwise  SKIN: denies any unusual skin lesions  EYES:denies blurred vision or double vision  HEENT: denies nasal congestion, sinus pain or ST  LUNGS: denies shortness of breath with exertion  CARDIOVASCULAR: denies chest pain on exertion  GI: denies abdominal pain,denies heartburn  : denies dysuria, vaginal discharge or itching   MUSCULOSKELETAL: denies back pain  NEURO: denies headaches  PSYCHE: denies depression or anxiety  HEMATOLOGIC: denies hx of anemia  ENDOCRINE: denies thyroid history  ALL/ASTHMA: +  asthma    EXAM:   /68   Pulse 73   Resp 16   Ht 5' (1.524 m)   Wt 125 lb (56.7 kg)   SpO2 98%   BMI 24.41 kg/m²   Body mass index is 24.41 kg/m².   GENERAL: alert and oriented X 3, well developed, well nourished,in no apparent distress  CARDIO: RRR without murmur  LUNGS: clear to auscultation  NECK: supple,no adenopathy,no thyromegaly  HEENT: atraumatic, normocephalic,ears and throat are clear  EYES:PERRLA, EOMI, normal,conjunctiva are clear  SKIN: norashes,no suspicious lesions  GI: good BS's,no masses, HSM or tenderness  CHEST: no chest tenderness  MUSCULOSKELETAL: back is  not tender,FROM of the back  EXTREMITIES: no cyanosis, clubbing or edema  NEURO: cranial nerves are intact,motor and sensory are grossly intact    ASSESSMENT AND PLAN:   Angelina Osuna is a 45 year old female who presents for annual physical     1. Routine general medical examination at health care facility    - Hemoglobin A1C; Future  - Comp Metabolic Panel (14); Future  - Vitamin B12; Future  - CBC With Differential With Platelet; Future  - Lipid Panel; Future  - Assay, Thyroid Stim Hormone; Future  - Free T4, (Free Thyroxine); Future  - Vitamin D [E]; Future  - Ferritin [E]; Future    2. Encounter for screening mammogram for high-risk patient    - [facility] PATRICK 2D+3D SCREENING BILAT (CPT=77067/00290); Future    3. Atypical nevi    - Derm Referral - In Network    Discussed diet, exercise,calcium, vitamin D, fish oil and self breast exams.   Questions answered and patient indicates understanding of these issues and agrees to the plan.  Follow up in 6 mo or sooner if needed.        HPI:   Angelina Osuna is a 45 year old female here to follow up on mood    Mild depressive symptoms   + anhedonia   + sleep varies   Low energy   Some trouble concentrating  Minimal anxiety   No SE    Normal appeite  no hopelessness   normal concentration     [facility] - Dr. Huizar  Using propranolol prn         Current Outpatient Medications   Medication Sig Dispense Refill    FLUoxetine HCl 20 MG Oral Tab Take 1 tablet (20 mg total) by mouth daily. 90 tablet 0    FLUoxetine 10 MG Oral Cap Take 1 capsule (10 mg total) by mouth daily. 90 capsule 0    Naratriptan HCl 1 MG Oral Tab       albuterol 108 (90 Base) MCG/ACT Inhalation Aero Soln Inhale 2 puffs into the lungs every 4 (four) hours as needed for Wheezing. 1 each 0    UBRELVY 100 MG Oral Tab TAKE 1 TABLET BY MOUTH AT ONSET OF MIGRAINE. MAY REPEAT 1 TIME IN 2 HOURS. MAX 2 PER DAY      propranolol 20 MG Oral Tab Take 1 tablet (20 mg total) by mouth 2  (two) times daily. (Patient not taking: Reported on 5/7/2025) 180 tablet 0    fluocinonide 0.05 % External Cream Apply bid For 10-14 days 30 g 0      No past medical history on file.   Past Surgical History:   Procedure Laterality Date    Homero biopsy stereo nodule 1 site right (cpt=19081)  3/2014    calc- stromal fibrosis      Family History   Problem Relation Age of Onset    Breast Cancer Maternal Cousin Female 40      Social History:   Social History     Socioeconomic History    Marital status:    Tobacco Use    Smoking status: Never    Smokeless tobacco: Never   Substance and Sexual Activity    Alcohol use: No    Drug use: No   Other Topics Concern    Caffeine Concern Yes     Comment: 1-2 cups coffee a day    Exercise Yes     Social Drivers of Health      [facility]    Housing Stability     Occ: . : . Children: 2  Works at home .   Exercise: three times per week.  Diet: watches minimally     REVIEW OF SYSTEMS:   GENERAL: feels well otherwise  SKIN: denies any unusual skin lesions  EYES:denies blurred vision or double vision  HEENT: denies nasal congestion, sinus pain or ST  LUNGS: denies shortness of breath with exertion  CARDIOVASCULAR: denies chest pain on exertion  GI: denies abdominal pain,denies heartburn  : denies dysuria, vaginal discharge or itching   MUSCULOSKELETAL: denies back pain  NEURO: denies headaches  PSYCHE: denies depression or anxiety  HEMATOLOGIC: denies hx of anemia  ENDOCRINE: denies thyroid history  ALL/ASTHMA: +  asthma    EXAM:   /68   Pulse 73   Resp 16   Ht 5' (1.524 m)   Wt 125 lb (56.7 kg)   SpO2 98%   BMI 24.41 kg/m²   GENERAL: alert and oriented X 3, well developed, well nourished,in no apparent distress  MUSCULOSKELETAL: back is not tender,FROM of the back  EXTREMITIES: no cyanosis, clubbing or edema  NEURO: cranial nerves are intact,motor and sensory are grossly intact      ASSESSMENT AND PLAN:   Angelina Dewey  Enrrique is a 45 year old female who presents with       4. Anxiety and depression    - FLUoxetine 10 MG Oral Cap; Take 1 capsule (10 mg total) by mouth daily.  Dispense: 90 capsule; Refill: 0  - FLUoxetine HCl 20 MG Oral Tab; Take 1 tablet (20 mg total) by mouth daily.  Dispense: 90 tablet; Refill: 0    Questions answered and patient indicates understanding of these issues and agrees to the plan.  Follow up in 6 mo or sooner if needed.

## 2025-05-07 ENCOUNTER — OFFICE VISIT (OUTPATIENT)
Dept: FAMILY MEDICINE CLINIC | Facility: CLINIC | Age: 45
End: 2025-05-07
Payer: COMMERCIAL

## 2025-05-07 VITALS
WEIGHT: 125 LBS | HEART RATE: 73 BPM | DIASTOLIC BLOOD PRESSURE: 68 MMHG | HEIGHT: 60 IN | OXYGEN SATURATION: 98 % | RESPIRATION RATE: 16 BRPM | SYSTOLIC BLOOD PRESSURE: 104 MMHG | BODY MASS INDEX: 24.54 KG/M2

## 2025-05-07 DIAGNOSIS — F32.A ANXIETY AND DEPRESSION: ICD-10-CM

## 2025-05-07 DIAGNOSIS — D22.9 ATYPICAL NEVI: ICD-10-CM

## 2025-05-07 DIAGNOSIS — F41.9 ANXIETY AND DEPRESSION: ICD-10-CM

## 2025-05-07 DIAGNOSIS — Z00.00 ROUTINE GENERAL MEDICAL EXAMINATION AT HEALTH CARE FACILITY: Primary | ICD-10-CM

## 2025-05-07 DIAGNOSIS — Z12.31 ENCOUNTER FOR SCREENING MAMMOGRAM FOR HIGH-RISK PATIENT: ICD-10-CM

## 2025-05-07 PROCEDURE — 99396 PREV VISIT EST AGE 40-64: CPT | Performed by: FAMILY MEDICINE

## 2025-05-07 PROCEDURE — 99214 OFFICE O/P EST MOD 30 MIN: CPT | Performed by: FAMILY MEDICINE

## 2025-05-07 RX ORDER — FLUOXETINE 10 MG/1
10 CAPSULE ORAL DAILY
Qty: 90 CAPSULE | Refills: 0 | OUTPATIENT
Start: 2025-05-07

## 2025-05-07 RX ORDER — FLUOXETINE 20 MG/1
20 TABLET, FILM COATED ORAL DAILY
Qty: 90 TABLET | Refills: 0 | OUTPATIENT
Start: 2025-05-07

## 2025-05-07 RX ORDER — FLUOXETINE 10 MG/1
10 CAPSULE ORAL DAILY
Qty: 90 CAPSULE | Refills: 0 | Status: SHIPPED | OUTPATIENT
Start: 2025-05-07

## 2025-05-07 RX ORDER — FLUOXETINE 20 MG/1
20 TABLET, FILM COATED ORAL DAILY
Qty: 90 TABLET | Refills: 0 | Status: SHIPPED | OUTPATIENT
Start: 2025-05-07

## 2025-05-07 NOTE — TELEPHONE ENCOUNTER
A refill request was received for:  Requested Prescriptions     Pending Prescriptions Disp Refills    FLUOXETINE 10 MG Oral Cap [Pharmacy Med Name: FLUOXETINE HCL 10 MG CAPSULE] 90 capsule 0     Sig: TAKE 1 CAPSULE BY MOUTH EVERY DAY    FLUOXETINE HCL 20 MG Oral Tab [Pharmacy Med Name: FLUOXETINE HCL 20 MG TABLET] 90 tablet 0     Sig: TAKE 1 TABLET BY MOUTH EVERY DAY       Last refill date:     1/27/25    Last office visit: 1/27/25 (Telemedicine)    Follow up due:  Future Appointments   Date Time Provider Department Center   5/7/2025  9:00 AM Tsering De Jesus DO [facility] [address]

## 2025-05-13 DIAGNOSIS — F41.9 ANXIETY AND DEPRESSION: ICD-10-CM

## 2025-05-13 DIAGNOSIS — F32.A ANXIETY AND DEPRESSION: ICD-10-CM

## 2025-05-13 RX ORDER — FLUOXETINE 20 MG/1
30 TABLET, FILM COATED ORAL DAILY
Qty: 135 TABLET | Refills: 0 | Status: SHIPPED | OUTPATIENT
Start: 2025-05-13

## 2025-05-13 RX ORDER — FLUOXETINE 10 MG/1
10 CAPSULE ORAL DAILY
Qty: 90 CAPSULE | Refills: 0 | OUTPATIENT
Start: 2025-05-13

## 2025-05-13 NOTE — TELEPHONE ENCOUNTER
Last filled on 5/7/25    Patient Comment: My insurance will not cover this in these doses, rather than 10 + 20 they need the dose to be 30.      I do not see 30 mg as an option in our data base to order. Please advise on how to fill this request

## 2025-05-14 ENCOUNTER — LAB ENCOUNTER (OUTPATIENT)
Dept: LAB | Age: 45
End: 2025-05-14
Attending: FAMILY MEDICINE
Payer: COMMERCIAL

## 2025-05-14 DIAGNOSIS — Z00.00 ROUTINE GENERAL MEDICAL EXAMINATION AT HEALTH CARE FACILITY: ICD-10-CM

## 2025-05-14 LAB
ALBUMIN SERPL-MCNC: 4.8 G/DL (ref 3.2–4.8)
ALBUMIN/GLOB SERPL: 1.5 {RATIO} (ref 1–2)
ALP LIVER SERPL-CCNC: 39 U/L (ref 37–98)
ALT SERPL-CCNC: 16 U/L (ref 10–49)
ANION GAP SERPL CALC-SCNC: 8 MMOL/L (ref 0–18)
AST SERPL-CCNC: 20 U/L (ref ?–34)
BASOPHILS # BLD AUTO: 0.03 X10(3) UL (ref 0–0.2)
BASOPHILS NFR BLD AUTO: 0.7 %
BILIRUB SERPL-MCNC: 0.6 MG/DL (ref 0.3–1.2)
BUN BLD-MCNC: 16 MG/DL (ref 9–23)
CALCIUM BLD-MCNC: 9.9 MG/DL (ref 8.7–10.6)
CHLORIDE SERPL-SCNC: 104 MMOL/L (ref 98–112)
CHOLEST SERPL-MCNC: 216 MG/DL (ref ?–200)
CO2 SERPL-SCNC: 25 MMOL/L (ref 21–32)
CREAT BLD-MCNC: 0.89 MG/DL (ref 0.55–1.02)
DEPRECATED HBV CORE AB SER IA-ACNC: 62 NG/ML (ref 50–306)
EGFRCR SERPLBLD CKD-EPI 2021: 82 ML/MIN/1.73M2 (ref 60–?)
EOSINOPHIL # BLD AUTO: 0.17 X10(3) UL (ref 0–0.7)
EOSINOPHIL NFR BLD AUTO: 4.1 %
ERYTHROCYTE [DISTWIDTH] IN BLOOD BY AUTOMATED COUNT: 12.6 %
EST. AVERAGE GLUCOSE BLD GHB EST-MCNC: 114 MG/DL (ref 68–126)
FASTING PATIENT LIPID ANSWER: YES
FASTING STATUS PATIENT QL REPORTED: YES
GLOBULIN PLAS-MCNC: 3.2 G/DL (ref 2–3.5)
GLUCOSE BLD-MCNC: 86 MG/DL (ref 70–99)
HBA1C MFR BLD: 5.6 % (ref ?–5.7)
HCT VFR BLD AUTO: 41.2 % (ref 35–48)
HDLC SERPL-MCNC: 74 MG/DL (ref 40–59)
HGB BLD-MCNC: 13.7 G/DL (ref 12–16)
IMM GRANULOCYTES # BLD AUTO: 0.01 X10(3) UL (ref 0–1)
IMM GRANULOCYTES NFR BLD: 0.2 %
LDLC SERPL CALC-MCNC: 132 MG/DL (ref ?–100)
LYMPHOCYTES # BLD AUTO: 1.25 X10(3) UL (ref 1–4)
LYMPHOCYTES NFR BLD AUTO: 30.4 %
MCH RBC QN AUTO: 30.9 PG (ref 26–34)
MCHC RBC AUTO-ENTMCNC: 33.3 G/DL (ref 31–37)
MCV RBC AUTO: 93 FL (ref 80–100)
MONOCYTES # BLD AUTO: 0.48 X10(3) UL (ref 0.1–1)
MONOCYTES NFR BLD AUTO: 11.7 %
NEUTROPHILS # BLD AUTO: 2.17 X10 (3) UL (ref 1.5–7.7)
NEUTROPHILS # BLD AUTO: 2.17 X10(3) UL (ref 1.5–7.7)
NEUTROPHILS NFR BLD AUTO: 52.9 %
NONHDLC SERPL-MCNC: 142 MG/DL (ref ?–130)
OSMOLALITY SERPL CALC.SUM OF ELEC: 284 MOSM/KG (ref 275–295)
PLATELET # BLD AUTO: 276 10(3)UL (ref 150–450)
POTASSIUM SERPL-SCNC: 4.1 MMOL/L (ref 3.5–5.1)
PROT SERPL-MCNC: 8 G/DL (ref 5.7–8.2)
RBC # BLD AUTO: 4.43 X10(6)UL (ref 3.8–5.3)
SODIUM SERPL-SCNC: 137 MMOL/L (ref 136–145)
T4 FREE SERPL-MCNC: 1.2 NG/DL (ref 0.8–1.7)
TRIGL SERPL-MCNC: 59 MG/DL (ref 30–149)
TSI SER-ACNC: 1.81 UIU/ML (ref 0.55–4.78)
VIT B12 SERPL-MCNC: 1100 PG/ML (ref 211–911)
VIT D+METAB SERPL-MCNC: 72.5 NG/ML (ref 30–100)
VLDLC SERPL CALC-MCNC: 11 MG/DL (ref 0–30)
WBC # BLD AUTO: 4.1 X10(3) UL (ref 4–11)

## 2025-05-14 PROCEDURE — 80053 COMPREHEN METABOLIC PANEL: CPT

## 2025-05-14 PROCEDURE — 82306 VITAMIN D 25 HYDROXY: CPT

## 2025-05-14 PROCEDURE — 83036 HEMOGLOBIN GLYCOSYLATED A1C: CPT

## 2025-05-14 PROCEDURE — 36415 COLL VENOUS BLD VENIPUNCTURE: CPT

## 2025-05-14 PROCEDURE — 84443 ASSAY THYROID STIM HORMONE: CPT

## 2025-05-14 PROCEDURE — 84439 ASSAY OF FREE THYROXINE: CPT

## 2025-05-14 PROCEDURE — 80061 LIPID PANEL: CPT

## 2025-05-14 PROCEDURE — 82607 VITAMIN B-12: CPT

## 2025-05-14 PROCEDURE — 82728 ASSAY OF FERRITIN: CPT

## 2025-05-14 PROCEDURE — 85025 COMPLETE CBC W/AUTO DIFF WBC: CPT

## 2025-05-20 ENCOUNTER — HOSPITAL ENCOUNTER (OUTPATIENT)
Dept: CT IMAGING | Age: 45
Discharge: HOME | End: 2025-05-20
Attending: FAMILY MEDICINE

## 2025-05-20 DIAGNOSIS — Z13.9 ENCOUNTER FOR SCREENING: ICD-10-CM

## 2025-05-20 DIAGNOSIS — Z13.9 SCREENING PROCEDURE: ICD-10-CM

## 2025-08-18 DIAGNOSIS — F41.9 ANXIETY AND DEPRESSION: ICD-10-CM

## 2025-08-18 DIAGNOSIS — F32.A ANXIETY AND DEPRESSION: ICD-10-CM

## 2025-08-18 RX ORDER — FLUOXETINE 20 MG/1
30 TABLET, FILM COATED ORAL DAILY
Qty: 135 TABLET | Refills: 0 | Status: SHIPPED | OUTPATIENT
Start: 2025-08-18

## (undated) DIAGNOSIS — R03.0 ELEVATED BLOOD PRESSURE READING WITHOUT DIAGNOSIS OF HYPERTENSION: ICD-10-CM

## (undated) DIAGNOSIS — G43.829 MENSTRUAL MIGRAINE WITHOUT STATUS MIGRAINOSUS, NOT INTRACTABLE: ICD-10-CM

## (undated) NOTE — MR AVS SNAPSHOT
[address]  [address] [facility], [address] [address] [phone]               Thank you for choosing us for your health care visit with Emily Joy, EDILMA.   We are glad to serve you and happy to provide you with Brent  ([facility])    [address]   [address]     ([address])         [address]    Mon-Fri  5am-8pm     Mon-Fri   7am-4pm  Sat         6am-3pm     Sat          7am-3pm      [address] - ondansetron 8 MG Tbdp            MyChart     Visit MyChart  You can access your MyChart to more actively manage your health care and view more details from this visit by going to [email or web address]   If you've recently had [email or web address] stay at the [facility]